# Patient Record
Sex: FEMALE | Race: WHITE | NOT HISPANIC OR LATINO | Employment: FULL TIME | URBAN - METROPOLITAN AREA
[De-identification: names, ages, dates, MRNs, and addresses within clinical notes are randomized per-mention and may not be internally consistent; named-entity substitution may affect disease eponyms.]

---

## 2018-06-14 ENCOUNTER — OFFICE VISIT (OUTPATIENT)
Dept: INTERNAL MEDICINE | Facility: CLINIC | Age: 23
End: 2018-06-14

## 2018-06-14 VITALS
RESPIRATION RATE: 20 BRPM | DIASTOLIC BLOOD PRESSURE: 70 MMHG | BODY MASS INDEX: 22.44 KG/M2 | HEIGHT: 67 IN | WEIGHT: 143 LBS | SYSTOLIC BLOOD PRESSURE: 100 MMHG

## 2018-06-14 DIAGNOSIS — L20.82 FLEXURAL ECZEMA: Primary | ICD-10-CM

## 2018-06-14 DIAGNOSIS — L70.0 CLOSED COMEDONE: ICD-10-CM

## 2018-06-14 PROCEDURE — 99203 OFFICE O/P NEW LOW 30 MIN: CPT | Performed by: FAMILY MEDICINE

## 2018-06-14 NOTE — PATIENT INSTRUCTIONS
It was nice meeting you today!  I look forward to getting to know you and helping you with your primary care needs.  For your skin rash on your chest, recommend that you try using the Eucrisa sample I have given you.  May use this medication twice a day.  For the skin rash on your upper chest, recommend that you try using an over the counter hypoallergenic cleansing/exfoliating soap/body wash to see if it helps.  Recommend that you sign up for My Chart (our patient portal).  You will be able to access lab results, request appointments and send our office messages.  If you are interested, please ask for My Chart access information at the check out desk.  Please schedule an appointment for your annual physical exam (if you have not already had one for this year) at your earliest convenience.  Please follow-up as indicated.  Return to the clinic sooner if your symptoms worsen or if you have any other concerns.

## 2018-06-14 NOTE — PROGRESS NOTES
Subjective   Haven TOMAS is a 23 y.o. female who presents to the clinic to establish care and for complaint of Rash      History of Present Illness  She reports that her previous PCP was at [facility] in [place], [place].  Transferred care due to relocating to [place].  Specialists include: None  Prescription medications include: None  OTC medications include: None    Patient reports complaint of rashes, first noticed about 2 months ago.  Initially noticed an itchy, erythematous, bumpy rash on her right forearm and then a slightly different bumpy rash occurred on her upper chest and left ankle.  States that the rash on her right forearm occurred after she mildly scratched herself.  Reports that she went to the [facility] about one month ago and was prescribed a short course of steroids, which helped with her left arm rash and overall itching.  States that the rash on her upper chest and left ankle continue to persist.  Denies using any new lotions, detergents or creams as well as any insect bites or other known contact irritants.  Reports having an allergy to nickel, but denies wearing any nickel jewelery recently.    Review of Systems   Constitutional: Negative for chills, fatigue and fever.   HENT: Negative for congestion, ear pain, rhinorrhea, sinus pressure and sore throat.    Eyes: Negative for pain and visual disturbance.   Respiratory: Negative for cough and shortness of breath.    Cardiovascular: Negative for chest pain and palpitations.   Gastrointestinal: Negative for abdominal pain, constipation, diarrhea, nausea and vomiting.   Genitourinary: Negative for decreased urine volume, dysuria and hematuria.   Musculoskeletal: Negative for back pain and gait problem.   Skin: Positive for rash. Negative for pallor.   Neurological: Negative for dizziness, syncope and headaches.   Psychiatric/Behavioral: The patient is not nervous/anxious.         Negative for depressed mood.     All other  "systems reviewed and are negative.    Past Medical History:   Diagnosis Date   • Positive TB test     reported negative chest xray       Family History   Problem Relation Age of Onset   • Obesity Mother    • Hyperlipidemia Father    • Hypertension Father    • Colon cancer Father 53   • Arthritis Maternal Grandmother    • Liver cancer Maternal Grandmother    • Heart attack Maternal Grandfather    • Diabetes Paternal Grandfather    • Hypertension Paternal Grandfather    • No Known Problems Paternal Grandmother      History reviewed. No pertinent surgical history.    Social History     Social History   • Marital status:      Spouse name: N/A   • Number of children: N/A   • Years of education: N/A     Occupational History   • Not on file.     Social History Main Topics   • Smoking status: Never Smoker   • Smokeless tobacco: Never Used   • Alcohol use No   • Drug use: No   • Sexual activity: Yes     Partners: Male     Birth control/ protection: Condom     Other Topics Concern   • Not on file     Social History Narrative   • No narrative on file       No current outpatient prescriptions on file.    Objective   /70   Resp 20   Ht 170.2 cm (67\")   Wt 64.9 kg (143 lb)   LMP 05/23/2018   BMI 22.40 kg/m²      Physical Exam   Constitutional: She is oriented to person, place, and time. She appears well-developed and well-nourished.   No acute distress.   HENT:   Head: Normocephalic and atraumatic.   Nose: Nose normal.   Eyes: Conjunctivae and EOM are normal.   Neck: Normal range of motion. Neck supple.   Cardiovascular: Normal rate, regular rhythm, normal heart sounds and intact distal pulses.    Pulmonary/Chest: Effort normal and breath sounds normal. No respiratory distress. She has no wheezes.   Abdominal: Soft. There is no tenderness.   Musculoskeletal: Normal range of motion.   Normal gait.   Neurological: She is alert and oriented to person, place, and time.   Skin: Skin is warm and dry.      "   Psychiatric: She has a normal mood and affect. Her behavior is normal.   Vitals reviewed.      Assessment/Plan   Haven was seen today for rash.    Diagnoses and all orders for this visit:    Flexural eczema    A sample of Eucrisa was given to patient today.    Recommended that patient try using this medication if rashes similar to the one on her right forearm recur.  Instructed patient on how to use medication.  Advised patient to return to the clinic if their symptoms worsen or persist despite the above recommendation.    Closed comedone    Suspected for lesions on upper chest.  Recommended that patient try using an OTC cleanser/exfoliating soap to see if her symptoms improve.  Advised patient to return to the clinic if their symptoms worsen or persist despite the above recommendation.  Will consider a trial of benzoyl peroxide if the above does not help.

## 2018-07-03 ENCOUNTER — OFFICE VISIT (OUTPATIENT)
Dept: INTERNAL MEDICINE | Facility: CLINIC | Age: 23
End: 2018-07-03

## 2018-07-03 VITALS
HEART RATE: 78 BPM | WEIGHT: 145 LBS | HEIGHT: 67 IN | OXYGEN SATURATION: 100 % | SYSTOLIC BLOOD PRESSURE: 100 MMHG | BODY MASS INDEX: 22.76 KG/M2 | RESPIRATION RATE: 20 BRPM | DIASTOLIC BLOOD PRESSURE: 70 MMHG

## 2018-07-03 DIAGNOSIS — Z00.00 HEALTH CARE MAINTENANCE: Primary | ICD-10-CM

## 2018-07-03 LAB
ALBUMIN SERPL-MCNC: 4.47 G/DL (ref 3.2–4.8)
ALBUMIN/GLOB SERPL: 1.8 G/DL (ref 1.5–2.5)
ALP SERPL-CCNC: 70 U/L (ref 25–100)
ALT SERPL W P-5'-P-CCNC: 22 U/L (ref 7–40)
ANION GAP SERPL CALCULATED.3IONS-SCNC: 9 MMOL/L (ref 3–11)
ARTICHOKE IGE QN: 104 MG/DL (ref 0–130)
AST SERPL-CCNC: 23 U/L (ref 0–33)
BASOPHILS # BLD AUTO: 0.01 10*3/MM3 (ref 0–0.2)
BASOPHILS NFR BLD AUTO: 0.2 % (ref 0–1)
BILIRUB SERPL-MCNC: 0.6 MG/DL (ref 0.3–1.2)
BUN BLD-MCNC: 17 MG/DL (ref 9–23)
BUN/CREAT SERPL: 22.7 (ref 7–25)
CALCIUM SPEC-SCNC: 9.1 MG/DL (ref 8.7–10.4)
CHLORIDE SERPL-SCNC: 104 MMOL/L (ref 99–109)
CHOLEST SERPL-MCNC: 187 MG/DL (ref 0–200)
CO2 SERPL-SCNC: 25 MMOL/L (ref 20–31)
CREAT BLD-MCNC: 0.75 MG/DL (ref 0.6–1.3)
DEPRECATED RDW RBC AUTO: 45.1 FL (ref 37–54)
EOSINOPHIL # BLD AUTO: 0.46 10*3/MM3 (ref 0–0.3)
EOSINOPHIL NFR BLD AUTO: 8.1 % (ref 0–3)
ERYTHROCYTE [DISTWIDTH] IN BLOOD BY AUTOMATED COUNT: 13.7 % (ref 11.3–14.5)
GFR SERPL CREATININE-BSD FRML MDRD: 95 ML/MIN/1.73
GLOBULIN UR ELPH-MCNC: 2.5 GM/DL
GLUCOSE BLD-MCNC: 76 MG/DL (ref 70–100)
HCT VFR BLD AUTO: 41.6 % (ref 34.5–44)
HDLC SERPL-MCNC: 69 MG/DL (ref 40–60)
HGB BLD-MCNC: 13.5 G/DL (ref 11.5–15.5)
HIV1+2 AB SER QL: NORMAL
IMM GRANULOCYTES # BLD: 0.02 10*3/MM3 (ref 0–0.03)
IMM GRANULOCYTES NFR BLD: 0.4 % (ref 0–0.6)
LYMPHOCYTES # BLD AUTO: 1.37 10*3/MM3 (ref 0.6–4.8)
LYMPHOCYTES NFR BLD AUTO: 24.2 % (ref 24–44)
MCH RBC QN AUTO: 29.4 PG (ref 27–31)
MCHC RBC AUTO-ENTMCNC: 32.5 G/DL (ref 32–36)
MCV RBC AUTO: 90.6 FL (ref 80–99)
MONOCYTES # BLD AUTO: 0.73 10*3/MM3 (ref 0–1)
MONOCYTES NFR BLD AUTO: 12.9 % (ref 0–12)
NEUTROPHILS # BLD AUTO: 3.08 10*3/MM3 (ref 1.5–8.3)
NEUTROPHILS NFR BLD AUTO: 54.2 % (ref 41–71)
PLATELET # BLD AUTO: 213 10*3/MM3 (ref 150–450)
PMV BLD AUTO: 11.4 FL (ref 6–12)
POTASSIUM BLD-SCNC: 4.3 MMOL/L (ref 3.5–5.5)
PROT SERPL-MCNC: 7 G/DL (ref 5.7–8.2)
RBC # BLD AUTO: 4.59 10*6/MM3 (ref 3.89–5.14)
SODIUM BLD-SCNC: 138 MMOL/L (ref 132–146)
TRIGL SERPL-MCNC: 71 MG/DL (ref 0–150)
WBC NRBC COR # BLD: 5.67 10*3/MM3 (ref 3.5–10.8)

## 2018-07-03 PROCEDURE — G0432 EIA HIV-1/HIV-2 SCREEN: HCPCS | Performed by: FAMILY MEDICINE

## 2018-07-03 PROCEDURE — 80061 LIPID PANEL: CPT | Performed by: FAMILY MEDICINE

## 2018-07-03 PROCEDURE — 80053 COMPREHEN METABOLIC PANEL: CPT | Performed by: FAMILY MEDICINE

## 2018-07-03 PROCEDURE — 85025 COMPLETE CBC W/AUTO DIFF WBC: CPT | Performed by: FAMILY MEDICINE

## 2018-07-03 PROCEDURE — 99395 PREV VISIT EST AGE 18-39: CPT | Performed by: FAMILY MEDICINE

## 2018-07-03 NOTE — PROGRESS NOTES
Subjective   Haven TOMAS is a 23 y.o. female who is  0, Para 0 who presents for her yearly preventative exam.  She has no complaints today.    Her overall health is: pretty good.  She does not see her dentist regularly, counseled.  Her diet is doing the keto diet (loosely), eats lot of vegetables and some fruits, drinks coconut or almond milk, drinks about 2 cups of coffee per day.  She describes her alcohol intake as none.  Her cardiovascular risk is: LDL goal is under 130.  She participates in regular exercise: goes to [employer] 2 times per week, runs 2 times per week, does yoga and body weight exercises.   Immunizations are up to date - Tdap reportedly within the last 10 years.    She is sexually active with her .  She does use a form of birth control.  She is using condoms for birth control.  GYN screening history: last pap: approximate date 10/2016 and was normal.   Family history of breast cancer: no  History of abnormal Pap smear: no  Family history of uterine or ovarian cancer: no    Family history of colon cancer: yes, father with 3 cancerous polyps removed at age 53    She does wear a seatbelt regularly.  She does not wear sunscreen regularly, counseled.    The following portions of the patient's history were reviewed and updated as appropriate: allergies, past medical history, past family history, surgeries, current medications, past social history and problem list.    Review of Systems   Constitutional: Negative for chills, fatigue and fever.   HENT: Negative for congestion, ear pain, rhinorrhea, sinus pressure and sore throat.    Eyes: Negative for pain and visual disturbance.   Respiratory: Negative for cough and shortness of breath.    Cardiovascular: Negative for chest pain and palpitations.   Gastrointestinal: Negative for abdominal pain, constipation, diarrhea, nausea and vomiting.   Genitourinary: Negative for decreased urine volume, dysuria and hematuria.   Musculoskeletal:  "Negative for back pain and gait problem.   Skin: Negative for pallor and rash.   Neurological: Negative for dizziness, syncope and headaches.   Psychiatric/Behavioral: The patient is not nervous/anxious.         Negative for depressed mood.       Past Medical History:   Diagnosis Date   • Positive TB test     reported negative chest xray       Family History   Problem Relation Age of Onset   • Obesity Mother    • Hyperlipidemia Father    • Hypertension Father    • Colon cancer Father 53   • Arthritis Maternal Grandmother    • Liver cancer Maternal Grandmother    • Heart attack Maternal Grandfather    • Diabetes Paternal Grandfather    • Hypertension Paternal Grandfather    • No Known Problems Paternal Grandmother        No past surgical history on file.    Social History     Social History   • Marital status:      Spouse name: N/A   • Number of children: N/A   • Years of education: N/A     Occupational History   • Not on file.     Social History Main Topics   • Smoking status: Never Smoker   • Smokeless tobacco: Never Used   • Alcohol use No   • Drug use: No   • Sexual activity: Yes     Partners: Male     Birth control/ protection: Condom     Other Topics Concern   • Not on file     Social History Narrative   • No narrative on file       No current outpatient prescriptions on file.    Objective   /70   Pulse 78   Resp 20   Ht 170.2 cm (67\")   Wt 65.8 kg (145 lb)   LMP 06/26/2018   SpO2 100%   BMI 22.71 kg/m²      Physical Exam   Constitutional: She is oriented to person, place, and time. She appears well-developed and well-nourished.   No acute distress.   HENT:   Head: Normocephalic and atraumatic.   Right Ear: Hearing, tympanic membrane, external ear and ear canal normal.   Left Ear: Hearing, tympanic membrane, external ear and ear canal normal.   Nose: Mucosal edema present. Right sinus exhibits no maxillary sinus tenderness and no frontal sinus tenderness. Left sinus exhibits no maxillary " sinus tenderness and no frontal sinus tenderness.   Mouth/Throat: Mucous membranes are normal.   Postnasal drainage noted.   Eyes: Conjunctivae and EOM are normal. Pupils are equal, round, and reactive to light.   Neck: Normal range of motion. Neck supple.   Cardiovascular: Normal rate, regular rhythm, normal heart sounds and intact distal pulses.    Pulmonary/Chest: Effort normal and breath sounds normal. No respiratory distress. She has no wheezes.   Breast exam deferred.   Abdominal: Soft. Bowel sounds are normal. There is no tenderness. There is no CVA tenderness and negative Zepeda's sign.   Genitourinary:   Genitourinary Comments: Deferred.   Musculoskeletal: Normal range of motion.        Cervical back: Normal.        Thoracic back: Normal.        Lumbar back: Normal.   Normal gait.   Neurological: She is alert and oriented to person, place, and time. She has normal strength. No cranial nerve deficit or sensory deficit.   Skin: Skin is warm and dry.   Psychiatric: She has a normal mood and affect. Her behavior is normal. Judgment and thought content normal.   Vitals reviewed.      Assessment/Plan   Haven was seen today for annual exam.    Diagnoses and all orders for this visit:    Health care maintenance  -     CBC Auto Differential  -     Comprehensive Metabolic Panel  -     Lipid Panel  -     HIV-1 / O / 2 Ag / Antibody 4th Generation    The above labs were ordered today.  Patient consented to gonorrhea and chlamydia screening today.  Next annual exam in 1 year.    Counseled regarding: age-appropriate screening labs and tests, wearing seatbelt and sunscreen regularly  Discussed: regular exercise and diet changes to maintain a healthy weight, seeing a dermatologist for annual skin exams

## 2018-07-03 NOTE — PATIENT INSTRUCTIONS
Please go to the lab before you leave to have your labs drawn.  You will be called or receive a letter with your results.  Things you can do to improve your overall health:  Maintain a normal body weight (BMI between 18.5 and 24.9).  Continue to consume a diet rich in fruits, vegetables and low-fat dairy products with a reduced content of saturated and total fat.  Continue to engage in regular aerobic physical activity, such as brisk walking (at least 30 minutes per day, most days of the week).  Wear sunscreen regularly.  Consider seeing a dermatologist for annual skin exams.  Please schedule an appointment with a dentist for your routine dental care.  Please follow-up as indicated.  Return to the clinic sooner if you have any other concerns.